# Patient Record
Sex: FEMALE | Race: ASIAN | NOT HISPANIC OR LATINO | Employment: OTHER | URBAN - METROPOLITAN AREA
[De-identification: names, ages, dates, MRNs, and addresses within clinical notes are randomized per-mention and may not be internally consistent; named-entity substitution may affect disease eponyms.]

---

## 2020-06-12 ENCOUNTER — AMBULATORY - HEALTHEAST (OUTPATIENT)
Dept: FAMILY MEDICINE | Facility: CLINIC | Age: 65
End: 2020-06-12

## 2020-06-12 ENCOUNTER — VIRTUAL VISIT (OUTPATIENT)
Dept: FAMILY MEDICINE | Facility: OTHER | Age: 65
End: 2020-06-12

## 2020-06-12 DIAGNOSIS — Z20.822 SUSPECTED COVID-19 VIRUS INFECTION: ICD-10-CM

## 2020-06-12 NOTE — PROGRESS NOTES
"Date: 2020 12:58:49  Clinician: Naveen Ferrara  Clinician NPI: [id number]  Patient: Yani Mitchell  Patient : 1955  Patient Address: [address] [place] [address]  Patient Phone: [phone]  Visit Protocol: URI  Patient Summary:  Yani is a 64 year old ( : 1955 ) female who initiated a Visit for COVID-19 (Coronavirus) evaluation and screening. When asked the question \"Please sign me up to receive news, health information and promotions from [employer].\", Yani responded \"No\".    Yani states her symptoms started suddenly 5-6 days ago.   Her symptoms consist of a sore throat, malaise, and myalgia.   Symptom details   Sore throat: Yani reports having mild throat pain (1-3 on a 10 point pain scale), does not have exudate on her tonsils, and can swallow liquids. The lymph nodes in her neck are not enlarged. A rash has not appeared on the skin since the sore throat started.    Yani denies having wheezing, nausea, teeth pain, ageusia, diarrhea, enlarged lymph nodes, anosmia, facial pain or pressure, fever, cough, nasal congestion, vomiting, rhinitis, ear pain, headache, and chills. She also denies having recent facial or sinus surgery in the past 60 days, double sickening (worsening symptoms after initial improvement), and taking antibiotic medication for the symptoms. She is not experiencing dyspnea.   Precipitating events  Within the past week, Yani has been exposed to someone with strep throat. She has not recently been exposed to someone with influenza. Yani has been in close contact with the following high risk individuals: adults 65 or older.   Pertinent COVID-19 (Coronavirus) information  In the past 14 days, Yani has not worked in a congregate living setting.   She does not work or volunteer as healthcare worker or a  and does not work or volunteer in a healthcare facility.   Yani also has not lived in a congregate living setting in the past 14 days. She lives with a healthcare worker.   Yani " has not had a close contact with a laboratory-confirmed COVID-19 patient within 14 days of symptom onset.   Pertinent medical history  Yani does not get yeast infections when she takes antibiotics.   Yani does not need a return to work/school note.   Weight: 140 lbs   Yani does not smoke or use smokeless tobacco.   Weight: 140 lbs    MEDICATIONS: No current medications, ALLERGIES: Penicillins, Vicks NyQuil Cold/Flu Liquicap  Clinician Response:  Dear Yani,      Your symptoms show that you may have coronavirus (COVID-19). This illness can cause fever, cough and trouble breathing. Many people get a mild case and get better on their own. Some people can get very sick.  What should I do?  We would like to test you for this virus. This will be a curbside test done outside the clinic.   1. Please call [phone] to schedule your visit. Explain that you were referred by [facility] to have a COVID-19 test. Be ready to share your [facility] visit ID number.  The following will serve as your written order for this COVID Test, ordered by me, for the indication of suspected COVID [Z20.828]: The test will be ordered in [employer], our electronic health record, after you are scheduled. It will show as ordered and authorized by Judah Moulton MD.  Order: COVID-19 (Coronavirus) PCR for SYMPTOMATIC testing from [facility].      2. When it's time for your COVID test:  Stay at least 6 feet away from others. (If someone will drive you to your test, stay in the backseat, as far away from the  as you can.)   Cover your mouth and nose with a mask, tissue or washcloth.  Go straight to the testing site. Don't make any stops on the way there or back.      3.Starting now: Stay home and away from others (self-isolate) until:   You've had no fever---and no medicine that reduces fever---for 3 full days (72 hours). And...   Your other symptoms have gotten better. For example, your cough or breathing has improved. And...   At least 10 days have passed since your  "symptoms started.       During this time, don't leave the house except for testing or medical care.   Stay in your own room, even for meals. Use your own bathroom if you can.   Stay away from others in your home. No hugging, kissing or shaking hands. No visitors.  Don't go to work, school or anywhere else.    Clean \"high touch\" surfaces often (doorknobs, counters, handles, etc.). Use a household cleaning spray or wipes. You'll find a full list of  on the EPA website: www.epa.gov/pesticide-registration/list-n-disinfectants-use-against-sars-cov-2.   Cover your mouth and nose with a mask, tissue or washcloth to avoid spreading germs.  Wash your hands and face often. Use soap and water.  Caregivers in these groups are at risk for severe illness due to COVID-19:  o People 65 years and older  o People who live in a nursing home or long-term care facility  o People with chronic disease (lung, heart, cancer, diabetes, kidney, liver, immunologic)  o People who have a weakened immune system, including those who:   Are in cancer treatment  Take medicine that weakens the immune system, such as corticosteroids  Had a bone marrow or organ transplant  Have an immune deficiency  Have poorly controlled HIV or AIDS  Are obese (body mass index of 40 or higher)  Smoke regularly   o Caregivers should wear gloves while washing dishes, handling laundry and cleaning bedrooms and bathrooms.  o Use caution when washing and drying laundry: Don't shake dirty laundry, and use the warmest water setting that you can.  o For more tips, go to www.cdc.gov/coronavirus/2019-ncov/downloads/10Things.pdf.      How can I take care of myself?   Get lots of rest. Drink extra fluids (unless a doctor has told you not to).   Take Tylenol (acetaminophen) for fever or pain. If you have liver or kidney problems, ask your family doctor if it's okay to take Tylenol.   Adults can take either:    650 mg (two 325 mg pills) every 4 to 6 hours, or...   1,000 mg " (two 500 mg pills) every 8 hours as needed.    Note: Don't take more than 3,000 mg in one day. Acetaminophen is found in many medicines (both prescribed and over-the-counter medicines). Read all labels to be sure you don't take too much.   For children, check the Tylenol bottle for the right dose. The dose is based on the child's age or weight.    If you have other health problems (like cancer, heart failure, an organ transplant or severe kidney disease): Call your specialty clinic if you don't feel better in the next 2 days.       Know when to call 911. Emergency warning signs include:    Trouble breathing or shortness of breath Pain or pressure in the chest that doesn't go away Feeling confused like you haven't felt before, or not being able to wake up Bluish-colored lips or face.  Where can I get more information?   [facility] -- About COVID-19: [email or web address]   CDC -- What to Do If You're Sick: www.cdc.gov/coronavirus/2019-ncov/about/steps-when-sick.html   CDC -- Ending Home Isolation: www.cdc.gov/coronavirus/2019-ncov/hcp/disposition-in-home-patients.html   CDC -- Caring for Someone: www.cdc.gov/coronavirus/2019-ncov/if-you-are-sick/care-for-someone.html   [facility] -- Interim Guidance for Hospital Discharge to Home: www.health.[facility].[place].us/diseases/coronavirus/hcp/hospdischarge.pdf   [facility] clinical trials (COVID-19 research studies): clinicalaffairs.[facility]-clinical-trials    Below are the COVID-19 hotlines at the [place] Department of Health ([facility]). Interpreters are available.    For health questions: Call 611-708-7139 or 1-660.881.9045 (7 a.m. to 7 p.m.) For questions about schools and childcare: Call [phone] or 1-989.623.1805 (7 a.m. to 7 p.m.)    Diagnosis: Other malaise  Diagnosis ICD: R53.81

## 2020-06-13 ENCOUNTER — COMMUNICATION - HEALTHEAST (OUTPATIENT)
Dept: FAMILY MEDICINE | Facility: CLINIC | Age: 65
End: 2020-06-13

## 2020-06-13 ENCOUNTER — COMMUNICATION - HEALTHEAST (OUTPATIENT)
Dept: EMERGENCY MEDICINE | Facility: CLINIC | Age: 65
End: 2020-06-13

## 2021-06-08 NOTE — TELEPHONE ENCOUNTER
Coronavirus (COVID-19) Notification    Patient  Meggan Mantilla    Reason for call  Notify of Positive Coronavirus (COVID-19) lab results, assess symptoms,  review [facility] recommendations    Lab Result    Lab test:  2019-nCoV rRt-PCR or SARS-CoV-2 PCR    Oropharyngeal AND/OR nasopharyngeal swabs is POSITIVE for 2019-nCoV RNA/SARS-COV-2 PCR (COVID-19 virus)    RN Recommendations/Instructions per [facility] Coronavirus COVID-19 recommendations    Brief introduction script  Hi, My name is Kay and I am calling on behalf of [employer].  We were notified that your Coronavirus test (COVID-19) for was POSITIVE for the virus.  I have some information to relay to you but first I wanted to mention that the [place] Dept of Health will be contacting you shortly [it's possible MD already called Patient] to talk to you more about how you are feeling and other people you have had contact with who might now also have the virus.  Also, [facility] is Partnering with the [facility] for Covid-19 research, you may be contacted directly by research staff.    Assessment (Inquire about Patient's current symptoms)  Doing well.  Symptoms started     If at time of call, Patients symptoms hare worsened, the Patient should contact 911 or have someone drive them to Emergency Dept promptly:      If Patient calling 911, inform 911 personal that you have tested positive for the Coronavirus (COVID-19).  Place mask on and await 911 to arrive.    If Emergency Dept, If possible, please have another adult drive you to the Emergency Dept but you need to wear mask when in contact with other people.      Review information with Patient    Your result was positive. This means you have COVID-19 (coronavirus).  We have sent you a letter that reviews the information that I'll be reviewing with you now.    How can I protect others?    If you have symptoms: stay home and away from others (self-isolate) until:    You've had no  fever--and no medicine that reduces fever--for 3 full days (72 hours). And      Your other symptoms have gotten better. For example, your cough or breathing has improved. And     At least 10 days have passed since your symptoms started.    If you don't have symptoms: Stay home and away from others (self-isolate) until at least 10 days have passed since your first positive COVID-19 test. (Date test collected).    During this time:    Stay in your own room, including for meals. Use your own bathroom if you can.    Stay away from others in your home. No hugging, kissing or shaking hands. No visitors.     Don't go to work, school or anywhere else.     Clean  high touch  surfaces often (doorknobs, counters, handles, etc.). Use a household cleaning spray or wipes. You'll find a full list on the EPA website at www.epa.gov/pesticide-registration/list-n-disinfectants-use-against-sars-cov-2.     Cover your mouth and nose with a mask, tissue or washcloth to avoid spreading germs.    Wash your hands and face often with soap and water.    Caregivers in these groups are at risk for severe illness due to COVID-19:  o People 65 years and older  o People who live in a nursing home or long-term care facility  o People with chronic disease (lung, heart, cancer, diabetes, kidney, liver, immunologic)  o People who have a weakened immune system, including those who:  - Are in cancer treatment  - Take medicine that weakens the immune system, such as corticosteroids  - Had a bone marrow or organ transplant  - Have an immune deficiency  - Have poorly controlled HIV or AIDS  - Are obese (body mass index of 40 or higher)  - Smoke regularly    Caregivers should wear gloves while washing dishes, handling laundry and cleaning bedrooms and bathrooms.    Wash and dry laundry with special caution. Don't shake dirty laundry, and use the warmest water setting you can.    If you have a weakened immune system, ask your doctor about other actions you  should take.    For more tips, go to www.cdc.gov/coronavirus/2019-ncov/downloads/10Things.pdf.    You should not go back to work until you meet the guidelines above for ending your home isolation. You should meet these along with any other guidelines that your employer has.    Employers: This document serves as formal notice of your employee's medical guidelines for going back to work. They must meet the above guidelines before going back to work in person.    How can I take care of myself?    1. Get lots of rest. Drink extra fluids (unless a doctor has told you not to).    2. Take Tylenol (acetaminophen) for fever or pain. If you have liver or kidney problems, ask your family doctor if it's okay to take Tylenol.     Take either:     650 mg (two 325 mg pills) every 4 to 6 hours, or     1,000 mg (two 500 mg pills) every 8 hours as needed.     Note: Don't take more than 3,000 mg in one day. Acetaminophen is found in many medicines (both prescribed and over-the-counter medicines). Read all labels to be sure you don't take too much.    For children, check the Tylenol bottle for the right dose (based on their age or weight).    3. If you have other health problems (like cancer, heart failure, an organ transplant or severe kidney disease): Call your specialty clinic if you don't feel better in the next 2 days.    4. Know when to call 911: Emergency warning signs include:    Trouble breathing or shortness of breath    Pain or pressure in the chest that doesn't go away    Feeling confused like you haven't felt before, or not being able to wake up    Bluish-colored lips or face    5. Sign up for [employer]. We know it's scary to hear that you have COVID-19. We want to track your symptoms to make sure you're okay over the next 2 weeks. Please look for an email from [employer]--this is a free, online program that we'll use to keep in touch. To sign up, follow the link in the email. Learn more at  [email or web address].    Where can I get more information?    [facility]: [email or web address]    Coronavirus Basics: www.health.[facility].mn.us/diseases/coronavirus/basics.html    What to Do If You're Sick: www.cdc.gov/coronavirus/2019-ncov/about/steps-when-sick.html    Ending Home Isolation: www.cdc.gov/coronavirus/2019-ncov/hcp/disposition-in-home-patients.html     Caring for Someone with COVID-19: www.cdc.gov/coronavirus/2019-ncov/if-you-are-sick/care-for-someone.html     [facility] clinical trials (COVID-19 research studies): clinicalaffairs.[facility]-clinical-trials     Positive COVID-19 letter sent (Yes/No):  Yes  Kay Li, MSN, RN

## 2021-06-20 NOTE — LETTER
Letter by Kay Li RN at      Author: Kay Li RN Service: -- Author Type: --    Filed:  Encounter Date: 6/13/2020 Status: (Other)       6/13/2020        Meggan Mantilla  [address] [place] [address]    This letter provides a written record that you were tested for COVID-19 on 6/12/20     Your result was positive.     This means that we found the virus that causes COVID-19 in your sample.    How can I protect others?    For safety, its very important to follow these rules.    First, stay home and away from others (self-isolate) until:      Youve had no fever--and no medicine that reduces fever--for 3 full days (72 hours). And?     Your other symptoms have gotten better. For example, your cough or breathing has improved. And?    At least 10 days have passed since your symptoms started.    During this time:      Stay in your own room (and use your own bathroom), if you can.    Stay away from others in your home. No hugging, kissing or shaking hands.    Dont let anyone visit.    Dont go to work, school or anywhere else.     Clean high touch surfaces often (doorknobs, counters, handles, etc.). Use a household cleaning spray or wipes.    Cover your mouth and nose with a mask, tissue or washcloth to avoid spreading germs.    Wash your hands and face often with soap and water.    You should not go back to work until you meet the guidelines above for ending your home isolation. You should meet these along with any other guidelines that your employer has.    Employers: This document serves as formal notice of your employees medical guidelines for going back to work. They must meet the above guidelines before going back to work in person.    How can I take care of myself?    1. Get lots of rest. Drink extra fluids (unless a doctor has told you not to).    2. Take Tylenol (acetaminophen) for fever or pain. If you have liver or kidney problems, ask your family doctor if its okay to take Tylenol.      Take either:     650 mg (two 325 mg pills) every 4 to 6 hours, or?    1,000 mg (two 500 mg pills) every 8 hours as needed.     Note: Dont take more than 3,000 mg in one day. Acetaminophen is found in many medicines (both prescribed and over-the-counter medicines). Read all labels to be sure you dont take too much.  For children, check the Tylenol bottle for the right dose. The dose is based on the lucero age or weight.    1. If you have other health problems (like cancer, heart failure, an organ transplant or severe kidney disease): Call your specialty clinic if you dont feel better in the next 2 days.    2. Know when to call 911: If your breathing is so bad that it keeps you from doing normal activities, call 911 or go to the emergency room. Tell them that youve been staying home and may have COVID-19.    3. Sign up for [employer]. We know its scary to hear that you have COVID-19. We want to track your symptoms to make sure youre okay over the next 2 weeks. Please look for an email from [employer]--this is a free, online program that well use to keep in touch. To sign up, follow the link in the email. Learn more at [email or web address].    4. Interested is participating in research? Visit the link below to view current clinical trials that apply to your situation:  https://clinicalaffairs.[facility].edu/[facility]-clinical-trials    Where can I get more information?    To learn the [facility] guidelines for staying home, please visit the [place] Department of Health website at https://www.health.state.[place].us/diseases/coronavirus/basics.html.    To learn more about COVID-19 and how to care for yourself at home, please visit the CDC website at https://www.cdc.gov/coronavirus/2019-ncov/about/steps-when-sick.html.    For more options for care at [facility], please visit our website at [email or web address]/.

## 2021-06-20 NOTE — LETTER
Letter by Kay Li RN at      Author: Kay Li RN Service: -- Author Type: --    Filed:  Encounter Date: 6/13/2020 Status: (Other)       6/13/2020        Meggan Mantilla  [address] [place] [address]    This letter provides a written record that you were tested for COVID-19 on 6/12/20.     Your result was negative.    This means that we didnt find the virus that causes COVID-19 in your sample. A test may show negative when you do actually have the virus. This can happen when the virus is in the early stages of infection, before you feel illness symptoms.    Even if you dont have symptoms, they may still appear. For safety, its very important to follow these rules.    Keep yourself away from others (self-isolation):      Stay home. Dont go to work, school or anywhere else.     Stay in your own room (and use your own bathroom), if you can.    Stay away from others in your home. No hugging, kissing or shaking hands. No visitors.    Clean high touch surfaces often (doorknobs, counters, handles, etc.). Use a household cleaning spray or wipes.    Cover your mouth and nose with a mask, tissue or washcloth to avoid spreading germs.    Wash your hands and face often with soap and water.    Stay in self-isolation until you meet ALL of the guidelines below:    1. You have had no fever for at least 72 hours (that is 3 full days of no fever without the use of medicine that reduces fevers), AND  2. other symptoms (such as cough, shortness of breath) have gotten better, AND  3. at least 10 days have passed since your symptoms first appeared.    Going back to work  Check with your employer for any guidelines to follow for going back to work.    Employers: This document serves as formal notice that your employee tested negative for COVID-19, as of the testing date shown above.    For questions regarding this letter or your Negative COVID-19 result, call [phone] between 8A to 6:30P (M-F) and 10A to  6:30P (weekends).

## 2021-06-27 ENCOUNTER — HEALTH MAINTENANCE LETTER (OUTPATIENT)
Age: 66
End: 2021-06-27

## 2021-10-17 ENCOUNTER — HEALTH MAINTENANCE LETTER (OUTPATIENT)
Age: 66
End: 2021-10-17

## 2022-07-11 ENCOUNTER — HOSPITAL ENCOUNTER (EMERGENCY)
Facility: CLINIC | Age: 67
Discharge: HOME | End: 2022-07-11
Attending: EMERGENCY MEDICINE | Admitting: EMERGENCY MEDICINE
Payer: COMMERCIAL

## 2022-07-11 ENCOUNTER — APPOINTMENT (OUTPATIENT)
Dept: CT IMAGING | Facility: CLINIC | Age: 67
End: 2022-07-11
Attending: EMERGENCY MEDICINE
Payer: COMMERCIAL

## 2022-07-11 VITALS
DIASTOLIC BLOOD PRESSURE: 79 MMHG | RESPIRATION RATE: 16 BRPM | WEIGHT: 137 LBS | HEIGHT: 60 IN | HEART RATE: 58 BPM | OXYGEN SATURATION: 100 % | BODY MASS INDEX: 26.9 KG/M2 | SYSTOLIC BLOOD PRESSURE: 151 MMHG | TEMPERATURE: 97.7 F

## 2022-07-11 DIAGNOSIS — K57.30 DIVERTICULOSIS OF LARGE INTESTINE WITHOUT HEMORRHAGE: ICD-10-CM

## 2022-07-11 DIAGNOSIS — K62.5 RECTAL BLEEDING: ICD-10-CM

## 2022-07-11 LAB
ABO/RH(D): NORMAL
ALBUMIN SERPL-MCNC: 3.9 G/DL (ref 3.5–5)
ALBUMIN UR-MCNC: NEGATIVE MG/DL
ALP SERPL-CCNC: 65 U/L (ref 45–120)
ALT SERPL W P-5'-P-CCNC: 14 U/L (ref 0–45)
ANION GAP SERPL CALCULATED.3IONS-SCNC: 8 MMOL/L (ref 5–18)
ANTIBODY SCREEN: NEGATIVE
APPEARANCE UR: CLEAR
APTT PPP: 31 SECONDS (ref 22–38)
AST SERPL W P-5'-P-CCNC: 20 U/L (ref 0–40)
ATRIAL RATE - MUSE: 58 BPM
BASOPHILS # BLD AUTO: 0 10E3/UL (ref 0–0.2)
BASOPHILS NFR BLD AUTO: 1 %
BILIRUB SERPL-MCNC: 0.4 MG/DL (ref 0–1)
BILIRUB UR QL STRIP: NEGATIVE
BUN SERPL-MCNC: 24 MG/DL (ref 8–22)
CALCIUM SERPL-MCNC: 9.4 MG/DL (ref 8.5–10.5)
CHLORIDE BLD-SCNC: 109 MMOL/L (ref 98–107)
CO2 SERPL-SCNC: 23 MMOL/L (ref 22–31)
COLOR UR AUTO: COLORLESS
CREAT SERPL-MCNC: 0.72 MG/DL (ref 0.6–1.1)
DIASTOLIC BLOOD PRESSURE - MUSE: NORMAL MMHG
EOSINOPHIL # BLD AUTO: 0.1 10E3/UL (ref 0–0.7)
EOSINOPHIL NFR BLD AUTO: 1 %
ERYTHROCYTE [DISTWIDTH] IN BLOOD BY AUTOMATED COUNT: 12.9 % (ref 10–15)
GFR SERPL CREATININE-BSD FRML MDRD: >90 ML/MIN/1.73M2
GLUCOSE BLD-MCNC: 97 MG/DL (ref 70–125)
GLUCOSE UR STRIP-MCNC: NEGATIVE MG/DL
HCT VFR BLD AUTO: 38.4 % (ref 35–47)
HEMOCCULT STL QL: POSITIVE
HGB BLD-MCNC: 12.4 G/DL (ref 11.7–15.7)
HGB UR QL STRIP: NEGATIVE
IMM GRANULOCYTES # BLD: 0 10E3/UL
IMM GRANULOCYTES NFR BLD: 0 %
INR PPP: 0.96 (ref 0.85–1.15)
INTERPRETATION ECG - MUSE: NORMAL
KETONES UR STRIP-MCNC: NEGATIVE MG/DL
LEUKOCYTE ESTERASE UR QL STRIP: NEGATIVE
LIPASE SERPL-CCNC: 28 U/L (ref 0–52)
LYMPHOCYTES # BLD AUTO: 1.5 10E3/UL (ref 0.8–5.3)
LYMPHOCYTES NFR BLD AUTO: 24 %
MCH RBC QN AUTO: 29.1 PG (ref 26.5–33)
MCHC RBC AUTO-ENTMCNC: 32.3 G/DL (ref 31.5–36.5)
MCV RBC AUTO: 90 FL (ref 78–100)
MONOCYTES # BLD AUTO: 0.4 10E3/UL (ref 0–1.3)
MONOCYTES NFR BLD AUTO: 7 %
NEUTROPHILS # BLD AUTO: 4.2 10E3/UL (ref 1.6–8.3)
NEUTROPHILS NFR BLD AUTO: 67 %
NITRATE UR QL: NEGATIVE
NRBC # BLD AUTO: 0 10E3/UL
NRBC BLD AUTO-RTO: 0 /100
P AXIS - MUSE: 25 DEGREES
PH UR STRIP: 6.5 [PH] (ref 5–7)
PLATELET # BLD AUTO: 192 10E3/UL (ref 150–450)
POTASSIUM BLD-SCNC: 4.3 MMOL/L (ref 3.5–5)
PR INTERVAL - MUSE: 172 MS
PROT SERPL-MCNC: 7.6 G/DL (ref 6–8)
QRS DURATION - MUSE: 80 MS
QT - MUSE: 434 MS
QTC - MUSE: 426 MS
R AXIS - MUSE: 30 DEGREES
RBC # BLD AUTO: 4.26 10E6/UL (ref 3.8–5.2)
RBC URINE: <1 /HPF
SODIUM SERPL-SCNC: 140 MMOL/L (ref 136–145)
SP GR UR STRIP: 1.05 (ref 1–1.03)
SPECIMEN EXPIRATION DATE: NORMAL
SYSTOLIC BLOOD PRESSURE - MUSE: NORMAL MMHG
T AXIS - MUSE: 30 DEGREES
TROPONIN I SERPL-MCNC: <0.01 NG/ML (ref 0–0.29)
UROBILINOGEN UR STRIP-MCNC: <2 MG/DL
VENTRICULAR RATE- MUSE: 58 BPM
WBC # BLD AUTO: 6.2 10E3/UL (ref 4–11)
WBC URINE: <1 /HPF

## 2022-07-11 PROCEDURE — 84484 ASSAY OF TROPONIN QUANT: CPT | Performed by: EMERGENCY MEDICINE

## 2022-07-11 PROCEDURE — 86850 RBC ANTIBODY SCREEN: CPT | Performed by: EMERGENCY MEDICINE

## 2022-07-11 PROCEDURE — 85730 THROMBOPLASTIN TIME PARTIAL: CPT | Performed by: EMERGENCY MEDICINE

## 2022-07-11 PROCEDURE — 93005 ELECTROCARDIOGRAM TRACING: CPT | Performed by: EMERGENCY MEDICINE

## 2022-07-11 PROCEDURE — 81001 URINALYSIS AUTO W/SCOPE: CPT | Performed by: EMERGENCY MEDICINE

## 2022-07-11 PROCEDURE — 74177 CT ABD & PELVIS W/CONTRAST: CPT

## 2022-07-11 PROCEDURE — 80053 COMPREHEN METABOLIC PANEL: CPT | Performed by: EMERGENCY MEDICINE

## 2022-07-11 PROCEDURE — 99285 EMERGENCY DEPT VISIT HI MDM: CPT | Mod: 25

## 2022-07-11 PROCEDURE — 85610 PROTHROMBIN TIME: CPT | Performed by: EMERGENCY MEDICINE

## 2022-07-11 PROCEDURE — 250N000011 HC RX IP 250 OP 636: Performed by: EMERGENCY MEDICINE

## 2022-07-11 PROCEDURE — 85025 COMPLETE CBC W/AUTO DIFF WBC: CPT | Performed by: EMERGENCY MEDICINE

## 2022-07-11 PROCEDURE — 36415 COLL VENOUS BLD VENIPUNCTURE: CPT | Performed by: EMERGENCY MEDICINE

## 2022-07-11 PROCEDURE — 83690 ASSAY OF LIPASE: CPT | Performed by: EMERGENCY MEDICINE

## 2022-07-11 PROCEDURE — 82272 OCCULT BLD FECES 1-3 TESTS: CPT | Performed by: EMERGENCY MEDICINE

## 2022-07-11 RX ORDER — IOPAMIDOL 755 MG/ML
100 INJECTION, SOLUTION INTRAVASCULAR ONCE
Status: COMPLETED | OUTPATIENT
Start: 2022-07-11 | End: 2022-07-11

## 2022-07-11 RX ADMIN — IOPAMIDOL 100 ML: 755 INJECTION, SOLUTION INTRAVENOUS at 16:14

## 2022-07-11 ASSESSMENT — ENCOUNTER SYMPTOMS
ABDOMINAL PAIN: 1
CHILLS: 0
FEVER: 0
BACK PAIN: 1
SHORTNESS OF BREATH: 0
HEMATURIA: 0
RECTAL PAIN: 0
HEADACHES: 0
EYE PAIN: 0
SORE THROAT: 0
DYSURIA: 0
BLOOD IN STOOL: 1
NUMBNESS: 1

## 2022-07-11 NOTE — ED PROVIDER NOTES
EMERGENCY DEPARTMENT ENCOUNTER      NAME: Meggan Mantilla  AGE: 66 year old female  YOB: 1955  MRN: [id number]  EVALUATION DATE & TIME: No admission date for patient encounter.    PCP: No primary care provider on file.    ED PROVIDER: Jovita Ferguson M.D.      CHIEF COMPLAINT     Chief Complaint   Patient presents with     Abdominal Pain     Rectal Bleeding         FINAL IMPRESSION:     1. Rectal bleeding    2. Diverticulosis of large intestine without hemorrhage          MEDICAL DECISION MAKING:       Pertinent Labs & Imaging studies reviewed. (See chart for details)    66 year old female presents to the Emergency Department for evaluation of left-sided abdominal pain and rectal bleeding.    ED Course as of 07/11/22 1809   Mon Jul 11, 2022   1711 Mrs. Mantilla is a 66-year-old female presents complaining of rectal bleeding and left-sided abdominal pain.  The bleeding has been going on since the beginning of July at around the ninth and the left lower abdominal pain has been intermittent in the longer than that.  She had a colonoscopy a few months ago was normal.  No urinary symptoms.  No chest pain or shortness of breath   1712 She is well-appearing on examination nontoxic.  Benign abdominal exam no guarding or rebound.   1712 Differential diagnosis for rectal bleeding and left lower quadrant abdominal pain include bleeding diverticulitis diverticulitis perforation malignancy AVM dissection aneurysm renal colic is mesenteric ischemia among others.   1712 IV was established.   1713 Normal white blood cell count hemoglobin normal renal function.  Normal liver function test normal coagulation normal urine.   1713 Patient according to the son had a reassuring colonoscopy.  No evidence of active hemorrhage right now she is hemodynamically stable with a normal hemoglobin.  We will get her to follow-up with primary care doctor and return for any concerns.   1727 With Dr. Yang gastroenterologist who was able to review  patient's records.  Her colonoscopy was in February.  She diverticulosis but no polyps.  She did have parasites and was given omendazole so to treat them.  .  Celia states is possible that the bleeding is from internal hemorrhoids or diverticular bleeding.  Given that she is stable she can be discharged follow-up with her primary care doctor and then for possible anoscope.   1804 Discussed this with the son.  He feels comfortable with this plan patient discharged ambulatory in stable condition.   1804 Clinical impression and decision making  66-year-old female presents here with left lower quadrant abdominal pain and rectal bleeding is intermittent abdominal pain has been going on for a few months the rectal bleeding has been persistent 9.  There is no pattern to it not associated with chest pain shortness of breath lightheadedness or dizziness.   1805 On examination she is well-appearing in no distress benign abdominal exam digital rectal examination reveals no external hemorrhoids no fissures bright red blood no oozing.   1805 Her hemoglobin is 12.4 normal platelets.  The close has hemoglobin that I have on [facility] is 2017 and it was 12.6.   1806 He had a colonoscopy in February that revealed diverticulosis and parasites but no other abnormalities.   1807 CT shows diverticulosis no masses no active source of bleeding.  Discussed with gastroenterology possible that this is internal hemorrhoids versus diverticular bleed she is otherwise stable very reliable son is very caring will discharge with follow-up primary care doctor this week for repeat hemoglobin and referral back to GI.  She was given strict discharge instructions that if the bleeding is more frequent she feels lightheaded abdominal pain fever to return.  Patient discharged ambulatory in stable condition.         Differential Diagnosis (include but not limited to)        Vital Signs: Hypertension  EKG: Sinus bradycardia  Imaging: CT abdomen  pelvis diverticulosis no acute  Home Meds: Reviewed  ED meds/abx:  Fluids: None none    Labs  K 4.3  Cr 0.72  Wbc 6.2  Hgb 12.4  Platelets 192  Troponin negative  Lipase 28  UA negative    Review of Previous Records  Per chart review, patient presented to [facility] ED on 1/18/2020 for a cough. 64 y.o. female presents to the Emergency Department for evaluation of progressive cough and worsening shortness of breath x2 weeks. After obtaining history of present illness, performing physical exam, and reviewing past medical records we decided to manage with a neb treatment here as well as screen with a chest x-ray.The chest x-ray returned unremarkable for acute infiltrate but did describe mild interstitial edema.  This is a nonspecific finding.  From a clinical perspective I do believe that the patient is suffering from pneumonia and could benefit from a course of antibiotics as well as an increase in steroids.  She was seen in urgent care and put on 10 mg of prednisone once daily, I feel that this dose is quite low for her acute illness.  She is not requiring any type of supportive cares with her oxygen supplementation.  She does not appear toxic therefore did not feel that she needed admission.When I looked further in the patient's records she has been dealing with cough/pneumonia diagnosis since October.  It does seem that symptoms have resolved and she is gotten back to her baseline but because of this remote history I did discuss further work-up in the form of full sets of labs, namely to look at cardiac possibilities, as well as a CT scan of the chest.  At this point time after hearing these options patient as well as family are comfortable with trialing a course of antibiotics and prednisone at home and will follow-up with her primary care or return to the ER if there is worsening symptoms.    Consults  Dr. Yang, GI    ED COURSE   4:44 PM I met with the patient in triage to gather history and to perform my initial  exam. We discussed plans for the ED course, including diagnostic testing and treatment. PPE: N95 mask, gloves, eye protection, scrub cap  5:22 PM I spoke to KWAKU Segal.   5:30 PM I rechecked and updated the patient who is now in room 7.   5:39 PM I paged Dr. Yang again.   5:54 PM I spoke to Dr. Yang again.   5:56 PM We discussed the plan for discharge and the patient is agreeable. Reviewed supportive cares, symptomatic treatment, outpatient follow up, and reasons to return to the Emergency Department. Patient to be discharged by ED RN.   At the conclusion of the encounter I discussed the results of all of the tests and the disposition. The questions were answered. The patient and son acknowledged understanding and was agreeable with the care plan.         MEDICATIONS GIVEN IN THE EMERGENCY:     Medications   iopamidol (ISOVUE-370) solution 100 mL (100 mLs Intravenous Given 7/11/22 1614)       NEW PRESCRIPTIONS STARTED AT TODAY'S ER VISIT     Discharge Medication List as of 7/11/2022  6:02 PM             =================================================================    HPI     Patient information was obtained from: patient     Use of :  Yes ( Phone) - Language Nathalie Mantilla is a 66 year old female who presents by walk in accompanied by her son for evaluation of left sided abdominal pain and bloody stools.     Patient reports intermittent bloody stools that began on July 9th. She denies any associated rectal pain. She also has left sided abdominal pain that has been intermittent for the past 10 months that sometimes radiates to her left back. She called the clinic today and was told to come in to the ED to be evaluated.     She notes occasional bilateral thigh numbness that has been present for more than 6 months but states she is otherwise healthy.     Denies any urinary symptoms, fevers, chills, headache, problems with her eyes, nose, or throat, chest pain, shortness of breath, syncope, or leg  swelling.     Patient is vaccinated against COVID-19. Denies smoking or drinking.     REVIEW OF SYSTEMS   Review of Systems   Constitutional: Negative for chills and fever.   HENT: Negative for ear pain and sore throat.    Eyes: Negative for pain and visual disturbance.   Respiratory: Negative for shortness of breath.    Cardiovascular: Negative for chest pain and leg swelling.   Gastrointestinal: Positive for abdominal pain (left sided and intermittent) and blood in stool (intermittent). Negative for rectal pain.   Genitourinary: Negative for dysuria and hematuria.   Musculoskeletal: Positive for back pain (left, intermittent).   Neurological: Positive for numbness (intermittent bilateral thigh). Negative for syncope and headaches.   All other systems reviewed and are negative.       PAST MEDICAL HISTORY:   History reviewed. No pertinent past medical history.    PAST SURGICAL HISTORY:   History reviewed. No pertinent surgical history.      CURRENT MEDICATIONS:   dicyclomine (BENTYL) 10 MG capsule         ALLERGIES:     Allergies   Allergen Reactions     Cat Dander [Animal Dander] Unknown     Nose pain     Dog Dander [Dog Epithelium] Unknown     Nose pain     Nyquil [External Allergen Needs Reconciliation - See Comment] Unknown     Sores in throat and mouth       FAMILY HISTORY:   History reviewed. No pertinent family history.    SOCIAL HISTORY:     Social History     Socioeconomic History     Marital status: Single   Tobacco Use     Smoking status: Never Smoker       VITALS:   BP (!) 151/79   Pulse 58   Temp 97.7  F (36.5  C) (Oral)   Resp 16   Ht 1.524 m (5')   Wt 62.1 kg (137 lb)   SpO2 100%   BMI 26.76 kg/m      PHYSICAL EXAM     Physical Exam  Vitals and nursing note reviewed. Exam conducted with a chaperone present.   Constitutional:       Appearance: She is well-developed.   Abdominal:      Comments: Soft no reproducible tenderness no guarding no rebound.  2+ femoral pulses.   Genitourinary:      Comments: With chaperone external anal area no fissures no external hemorrhoids digital rectal examination reveals no masses there is blood no hemorrhaging no bruising.  Neurological:      Mental Status: She is alert.         Physical Exam   Constitutional: Well appearing, cooperative, pleasant    Head: Atraumatic.     Nose: Nose normal.     Mouth/Throat: Oropharynx is clear and moist.     Eyes: EOM are normal. Pupils are equal, round, and reactive to light.     Ears: Bilateral pearly white TM.    Neck: Normal range of motion. Neck supple.     Cardiovascular: Normal rate, regular rhythm and normal heart sounds.      Pulmonary/Chest: Normal effort  and breath sounds normal.     Abdominal: Soft.    Musculoskeletal: Normal range of motion.     Neurological: No deficits.    Lymphatics: No edema    : LUBA bright blood note was seen no hemorrhaging.    Skin: Skin is warm and dry.     Psychiatric: Normal mood and affect. Behavior is normal.       LAB:     All pertinent labs reviewed and interpreted.  Labs Ordered and Resulted from Time of ED Arrival to Time of ED Departure   COMPREHENSIVE METABOLIC PANEL - Abnormal       Result Value    Sodium 140      Potassium 4.3      Chloride 109 (*)     Carbon Dioxide (CO2) 23      Anion Gap 8      Urea Nitrogen 24 (*)     Creatinine 0.72      Calcium 9.4      Glucose 97      Alkaline Phosphatase 65      AST 20      ALT 14      Protein Total 7.6      Albumin 3.9      Bilirubin Total 0.4      GFR Estimate >90     ROUTINE UA WITH MICROSCOPIC REFLEX TO CULTURE - Abnormal    Color Urine Colorless      Appearance Urine Clear      Glucose Urine Negative      Bilirubin Urine Negative      Ketones Urine Negative      Specific Gravity Urine 1.049 (*)     Blood Urine Negative      pH Urine 6.5      Protein Albumin Urine Negative      Urobilinogen Urine <2.0      Nitrite Urine Negative      Leukocyte Esterase Urine Negative      RBC Urine <1      WBC Urine <1     INR - Normal    INR 0.96      PARTIAL THROMBOPLASTIN TIME - Normal    aPTT 31     LIPASE - Normal    Lipase 28     TROPONIN I - Normal    Troponin I <0.01     CBC WITH PLATELETS AND DIFFERENTIAL    WBC Count 6.2      RBC Count 4.26      Hemoglobin 12.4      Hematocrit 38.4      MCV 90      MCH 29.1      MCHC 32.3      RDW 12.9      Platelet Count 192      % Neutrophils 67      % Lymphocytes 24      % Monocytes 7      % Eosinophils 1      % Basophils 1      % Immature Granulocytes 0      NRBCs per 100 WBC 0      Absolute Neutrophils 4.2      Absolute Lymphocytes 1.5      Absolute Monocytes 0.4      Absolute Eosinophils 0.1      Absolute Basophils 0.0      Absolute Immature Granulocytes 0.0      Absolute NRBCs 0.0     TYPE AND SCREEN, ADULT    ABO/RH(D) B POS      Antibody Screen Negative      SPECIMEN EXPIRATION DATE [id number]     ABO/RH TYPE AND SCREEN        RADIOLOGY:     Reviewed all pertinent imaging. Please see official radiology report.  CT Abdomen Pelvis w Contrast   Final Result   IMPRESSION:    1.  No abnormalities are seen to explain pain.   2.  There are a few sigmoid diverticuli but no evidence of diverticulitis.           EKG:     EKG #1  Sinus bradycardia normal anterior progression normal axis    Time:810257    Ventricular rate 58 bmp  Axis normal  VA interval 172 ms  QRS duration 80 ms  QT//426 ms    Compared to previous EKG on no previous available for comparison  I have independently reviewed and interpreted the EKG(s) documented above.      PROCEDURES:     Procedures      I, Rhea Franco, am serving as a scribe to document services personally performed by Dr. Ferguson based on my observation and the provider's statements to me. I, Jovita Ferguson MD attest that Rhea Franco is acting in a scribe capacity, has observed my performance of the services and has documented them in accordance with my direction.    Jovita Ferguson M.D.  Emergency Medicine  [facility]  EMERGENCY ROOM  [address] [place] [id number]  [phone]  Dept: [phone]     Jovita Ferguson MD  07/11/22 7030

## 2022-07-11 NOTE — ED TRIAGE NOTES
"Pt presents to the ED with c/o LLQ abdominal pain for \"a while\" and blood in stools since 7/9/22. Pt has seen MD for this and has had a colonoscopy this year. Denies fevers or emesis.        "

## 2022-07-11 NOTE — DISCHARGE INSTRUCTIONS
Read and follow the discharge instructions.    Your CT shows some diverticulosis but did not show any inflammation or infection.    It is Possible that your bleeding is coming from an internal hemorrhoid or from diverticuli.    Your primary care doctor tomorrow to make a follow-up appointment for reevaluation this week to have your blood checked.    Will benefit from seeing the gastroenterologist again to see where the source of the bleeding is coming from.    Return immediately if you having all bloody stools you feel fainting worsening pain fever or any other concerns

## 2022-07-24 ENCOUNTER — HEALTH MAINTENANCE LETTER (OUTPATIENT)
Age: 67
End: 2022-07-24

## 2022-10-02 ENCOUNTER — HEALTH MAINTENANCE LETTER (OUTPATIENT)
Age: 67
End: 2022-10-02

## 2023-05-11 ENCOUNTER — APPOINTMENT (OUTPATIENT)
Dept: CT IMAGING | Facility: HOSPITAL | Age: 68
End: 2023-05-11
Payer: COMMERCIAL

## 2023-05-11 ENCOUNTER — HOSPITAL ENCOUNTER (EMERGENCY)
Facility: HOSPITAL | Age: 68
Discharge: HOME | End: 2023-05-11
Admitting: PHYSICIAN ASSISTANT
Payer: COMMERCIAL

## 2023-05-11 VITALS
TEMPERATURE: 97.8 F | OXYGEN SATURATION: 99 % | BODY MASS INDEX: 27.48 KG/M2 | SYSTOLIC BLOOD PRESSURE: 178 MMHG | WEIGHT: 140 LBS | HEIGHT: 60 IN | HEART RATE: 58 BPM | RESPIRATION RATE: 17 BRPM | DIASTOLIC BLOOD PRESSURE: 99 MMHG

## 2023-05-11 DIAGNOSIS — R42 DIZZINESS: ICD-10-CM

## 2023-05-11 LAB
ALBUMIN SERPL BCG-MCNC: 3.9 G/DL (ref 3.5–5.2)
ALBUMIN UR-MCNC: NEGATIVE MG/DL
ALP SERPL-CCNC: 76 U/L (ref 35–104)
ALT SERPL W P-5'-P-CCNC: 19 U/L (ref 10–35)
ANION GAP SERPL CALCULATED.3IONS-SCNC: 10 MMOL/L (ref 7–15)
APPEARANCE UR: CLEAR
AST SERPL W P-5'-P-CCNC: 28 U/L (ref 10–35)
BILIRUB SERPL-MCNC: 0.3 MG/DL
BILIRUB UR QL STRIP: NEGATIVE
BUN SERPL-MCNC: 20.3 MG/DL (ref 8–23)
CALCIUM SERPL-MCNC: 9.1 MG/DL (ref 8.8–10.2)
CHLORIDE SERPL-SCNC: 107 MMOL/L (ref 98–107)
COLOR UR AUTO: COLORLESS
CREAT SERPL-MCNC: 0.95 MG/DL (ref 0.51–0.95)
DEPRECATED HCO3 PLAS-SCNC: 23 MMOL/L (ref 22–29)
ERYTHROCYTE [DISTWIDTH] IN BLOOD BY AUTOMATED COUNT: 13.2 % (ref 10–15)
GFR SERPL CREATININE-BSD FRML MDRD: 65 ML/MIN/1.73M2
GLUCOSE SERPL-MCNC: 98 MG/DL (ref 70–99)
GLUCOSE UR STRIP-MCNC: NEGATIVE MG/DL
HCT VFR BLD AUTO: 35.1 % (ref 35–47)
HGB BLD-MCNC: 11.5 G/DL (ref 11.7–15.7)
HGB UR QL STRIP: NEGATIVE
KETONES UR STRIP-MCNC: NEGATIVE MG/DL
LEUKOCYTE ESTERASE UR QL STRIP: ABNORMAL
MAGNESIUM SERPL-MCNC: 2.1 MG/DL (ref 1.7–2.3)
MCH RBC QN AUTO: 29.6 PG (ref 26.5–33)
MCHC RBC AUTO-ENTMCNC: 32.8 G/DL (ref 31.5–36.5)
MCV RBC AUTO: 90 FL (ref 78–100)
MUCOUS THREADS #/AREA URNS LPF: PRESENT /LPF
NITRATE UR QL: NEGATIVE
PH UR STRIP: 6 [PH] (ref 5–7)
PLATELET # BLD AUTO: 181 10E3/UL (ref 150–450)
POTASSIUM SERPL-SCNC: 3.7 MMOL/L (ref 3.4–5.3)
PROT SERPL-MCNC: 6.7 G/DL (ref 6.4–8.3)
RBC # BLD AUTO: 3.89 10E6/UL (ref 3.8–5.2)
RBC URINE: <1 /HPF
SODIUM SERPL-SCNC: 140 MMOL/L (ref 136–145)
SP GR UR STRIP: 1.01 (ref 1–1.03)
SQUAMOUS EPITHELIAL: <1 /HPF
UROBILINOGEN UR STRIP-MCNC: <2 MG/DL
WBC # BLD AUTO: 4.9 10E3/UL (ref 4–11)
WBC URINE: 1 /HPF

## 2023-05-11 PROCEDURE — 99285 EMERGENCY DEPT VISIT HI MDM: CPT | Mod: 25

## 2023-05-11 PROCEDURE — 70450 CT HEAD/BRAIN W/O DYE: CPT

## 2023-05-11 PROCEDURE — 80053 COMPREHEN METABOLIC PANEL: CPT | Performed by: PHYSICIAN ASSISTANT

## 2023-05-11 PROCEDURE — 96360 HYDRATION IV INFUSION INIT: CPT

## 2023-05-11 PROCEDURE — 250N000013 HC RX MED GY IP 250 OP 250 PS 637: Performed by: PHYSICIAN ASSISTANT

## 2023-05-11 PROCEDURE — 93005 ELECTROCARDIOGRAM TRACING: CPT | Performed by: PHYSICIAN ASSISTANT

## 2023-05-11 PROCEDURE — 82310 ASSAY OF CALCIUM: CPT | Performed by: PHYSICIAN ASSISTANT

## 2023-05-11 PROCEDURE — 85014 HEMATOCRIT: CPT | Performed by: PHYSICIAN ASSISTANT

## 2023-05-11 PROCEDURE — 87086 URINE CULTURE/COLONY COUNT: CPT | Performed by: PHYSICIAN ASSISTANT

## 2023-05-11 PROCEDURE — 258N000003 HC RX IP 258 OP 636: Performed by: PHYSICIAN ASSISTANT

## 2023-05-11 PROCEDURE — 83735 ASSAY OF MAGNESIUM: CPT | Performed by: PHYSICIAN ASSISTANT

## 2023-05-11 PROCEDURE — 36415 COLL VENOUS BLD VENIPUNCTURE: CPT | Performed by: PHYSICIAN ASSISTANT

## 2023-05-11 PROCEDURE — 96361 HYDRATE IV INFUSION ADD-ON: CPT

## 2023-05-11 PROCEDURE — 81003 URINALYSIS AUTO W/O SCOPE: CPT | Performed by: PHYSICIAN ASSISTANT

## 2023-05-11 RX ORDER — MECLIZINE HCL 12.5 MG 12.5 MG/1
25 TABLET ORAL ONCE
Status: COMPLETED | OUTPATIENT
Start: 2023-05-11 | End: 2023-05-11

## 2023-05-11 RX ADMIN — SODIUM CHLORIDE 500 ML: 9 INJECTION, SOLUTION INTRAVENOUS at 13:53

## 2023-05-11 RX ADMIN — MECLIZINE 25 MG: 12.5 TABLET ORAL at 16:00

## 2023-05-11 ASSESSMENT — ACTIVITIES OF DAILY LIVING (ADL): ADLS_ACUITY_SCORE: 35

## 2023-05-11 NOTE — ED NOTES
Ambulate to bathroom independently.  States she gets dizzy when she bends over and moves head side to side.

## 2023-05-11 NOTE — DISCHARGE INSTRUCTIONS
Your labs and imaging are all reassuring.    Given you are feeling better and your symptoms seem most consistent with a peripheral vertigo, we are discharging you home.    Please schedule a follow-up with your primary care doctor.  If you have worsening dizziness, worsening headaches, new neurologic changes such as one-sided weakness or vision changes or difficulty talking, return to the ER.

## 2023-05-11 NOTE — ED PROVIDER NOTES
ED PROVIDER NOTE    EMERGENCY DEPARTMENT ENCOUNTER      NAME: Meggan Mantilla  AGE: 67 year old female  YOB: 1955  MRN: [id number]  EVALUATION DATE & TIME: No admission date for patient encounter.    PCP: Deidre Starr    ED PROVIDER: Camille Maharaj PA-C      Chief Complaint   Patient presents with     Dizziness         FINAL IMPRESSION:  No diagnosis found.      MEDICAL DECISION MAKING:    Pertinent Labs & Imaging studies reviewed. (See chart for details)  67 year old female presents to the Emergency Department with her daughter for evaluation of headache and dizziness. They report a prior fall ~ 2 months ago and she has had ongoing headaches since 2 weeks after the fall. Over the last week she has had more dizziness.  The dizziness is only present with movement and specifically head movement. No other neurologic symptoms.    Here, she is hypertensive but otherwise vitals stable. Her neuro exam is completely normal. She ambulates without difficulty. Presentation seems most c/w peripheral vertigo and tension type headache. Could also be post-concussive syndrome. Daughter also notes she has been under more stress and eating a little less than normal. Also considered dehydration, anemia, ERICK, metabolic imbalance. Nothing to suggest ACS, PE. No infectious symptoms.    With her head trauma and no prior workup did obtain head CT and this was unremarkable. Also obtained labs and UA which were also completely unremarkable.     Patient had improvement in her symptoms after some IV fluids and felt much improved after meclizine.     AT this time I feel she is stable for discharge and f/u with pcp in the next week. I considered ischemic CVA however given descriptions of dizziness, normal neuro exam and timing of symptoms with normal head CT I think this is very unlikely. Did discuss close return precautions including new neuro symptoms or worsening headaches.     At the conclusion of the encounter I discussed the results  "of all of the tests and the disposition. The questions were answered. The patient or family acknowledged understanding and was agreeable with the care plan.       Medical Decision Making    History:    Supplemental history from: Family Member/Significant Other    External Record(s) reviewed: Documented in chart, if applicable.    Work Up:    Chart documentation includes differential considered and any EKGs or imaging independently interpreted by provider, where specified.    In additional to work up documented, I considered the following work up: Documented in chart, if applicable.    External consultation:    Discussion of management with another provider: Documented in chart, if applicable    Complicating factors:    Care impacted by chronic illness: N/A    Care affected by social determinants of health: N/A    Disposition considerations: Discharge. No recommendations on prescription strength medication(s). N/A.          ED COURSE  1:30 PM Met and evaluated patient. Discussed ED plan.   3:13 PM Rechecked and updated patient. Patient states that she feels better and that her headache is improving.      MEDICATIONS GIVEN IN THE EMERGENCY:  Medications   0.9% sodium chloride BOLUS (500 mLs Intravenous $New Bag 5/11/23 2783)       NEW PRESCRIPTIONS STARTED AT TODAY'S ER VISIT  New Prescriptions    No medications on file          =================================================================    HPI    Patient information was obtained from: patient    Use of : Yes - Daughter as informal  - Language: Nathalie Mantilla is a 67 year old female who presents by walk in for evaluation of dizziness and headache.    A few days before March 15th, patient slipped and fell on ice. Her daughter received the Ring video footage on March 15th and saw it happen. Patient denies loss of consciousness but endorsed a lot of pain after the fall.  Two weeks after the fall, she developed a \"pressure\" in the left " "side of her posterior lateral head and has had headaches since then. They present to this ED today for concerns of a new onset of \"room-spinning\" dizziness for one week.This dizziness occurs only with standing or turning her body quickly.    Patient's daughter notes that patient has been under a great deal of stress lately, and her appetite and intake have been poor, so she is also concerned for dehydration.    She denies nausea, vomiting, diarrhea, shortness of breath, or chest pain. No new visual disturbances.     REVIEW OF SYSTEMS   See HPI, otherwise all other systems reviewed and are negative    PAST MEDICAL HISTORY:  History reviewed. No pertinent past medical history.    PAST SURGICAL HISTORY:  History reviewed. No pertinent surgical history.        CURRENT MEDICATIONS:    No current facility-administered medications for this encounter.    Current Outpatient Medications:      dicyclomine (BENTYL) 10 MG capsule, [DICYCLOMINE (BENTYL) 10 MG CAPSULE] TAKE 1 PILL BY MOUTH 3 TIMES EVERYDAY/ TXHUA HNUB NOJ 1 LUB TSHUAJ 3 ZAUG, Disp: 60 capsule, Rfl: 1    ALLERGIES:  Allergies   Allergen Reactions     Cat Dander [Animal Dander] Unknown     Nose pain     Dog Dander [Dog Epithelium Allergy Skin Test] Unknown     Nose pain     Nyquil [External Allergen Needs Reconciliation - See Comment] Unknown     Sores in throat and mouth       FAMILY HISTORY:  History reviewed. No pertinent family history.      VITALS:  Vitals:    05/11/23 1319   BP: 135/77   Pulse: 71   Resp: 20   Temp: 97.8  F (36.6  C)   SpO2: 96%   Weight: 63.5 kg (140 lb)   Height: 1.524 m (5')       PHYSICAL EXAM    General Appearance:  Alert, cooperative, no distress, appears stated age  HENT: Normocephalic without obvious deformity, atraumatic. Mucous membranes moist   Eyes: Conjunctiva clear, Lids normal. No discharge.   Respiratory: No distress. Lungs clear to ausculation bilaterally. No wheezes, rhonchi or stridor  Cardiovascular: Regular rate and " rhythm, no murmur. Normal cap refill. No peripheral edema  GI: Abdomen soft, nontender, normal bowel sounds  : No CVA tenderness  Musculoskeletal: Moving all extremities. No gross deformities  Integument: Warm, dry, no rashes or lesions  Neurologic: Alert & oriented to person, place and time. Normal tone. PERRL. Normal speech, no dysarthria. CN 2 full visual fields, 3/4/6 EOMI without nystagmus, 5 Sensory intact, 7 Motor intact, face symmetric, 8 Hearing intact, 9,10 11 normal strength, 12 Tongue midline.  Motor: RUE/LUE 5/5  strength and push/pull bilaterally, RLE/LLE 5/5 hip flexion and ankle dorsi/plantar flexion bilaterally. No pronator drift. Sensory: intact to light touch b/l  Gait: normal. Neg rhomberg.  Psychomotor slowing (-). Abnormal Movements (-).Rapid alternating Movements intact.  Finger nose finger intact.  Psych: Normal mood and affect        LAB:  Labs Ordered and Resulted from Time of ED Arrival to Time of ED Departure   CBC WITH PLATELETS - Abnormal       Result Value    WBC Count 4.9      RBC Count 3.89      Hemoglobin 11.5 (*)     Hematocrit 35.1      MCV 90      MCH 29.6      MCHC 32.8      RDW 13.2      Platelet Count 181     COMPREHENSIVE METABOLIC PANEL - Normal    Sodium 140      Potassium 3.7      Chloride 107      Carbon Dioxide (CO2) 23      Anion Gap 10      Urea Nitrogen 20.3      Creatinine 0.95      Calcium 9.1      Glucose 98      Alkaline Phosphatase 76      AST 28      ALT 19      Protein Total 6.7      Albumin 3.9      Bilirubin Total 0.3      GFR Estimate 65     MAGNESIUM - Normal    Magnesium 2.1     ROUTINE UA WITH MICROSCOPIC REFLEX TO CULTURE       RADIOLOGY:  Head CT w/o contrast   Final Result   IMPRESSION:   1.  No acute intracranial process.          EKG:    Performed at: 1452  Impression: NSR rate 60. Normal ECG. No change from prior on July 11, 2022  Dr. Saeed and JULIETTE have independently reviewed and interpreted the EKG(s) documented above.      Meaghan SEGOVIA  Areli, am serving as a scribe to document services personally performed by Camille Maharaj PA-C based on my observation and the provider's statements to me. I, Camille Maharaj PA-C attest that Meaghan Singleton is acting in a scribe capacity, has observed my performance of the services and has documented them in accordance with my direction.    Camille Maharaj PA-C   Emergency Medicine           Camille Maharaj PA-C  05/12/23 9523

## 2023-05-11 NOTE — ED TRIAGE NOTES
"Patient Hmong speaking, daughter here helping with language. Patient fell on ice in March \"hit her head hard\" no thinners. Developed HA and dizziness 2 weeks after and continues. Has not tried any OTC meds for pain.      "

## 2023-05-12 LAB
ATRIAL RATE - MUSE: 60 BPM
DIASTOLIC BLOOD PRESSURE - MUSE: NORMAL MMHG
INTERPRETATION ECG - MUSE: NORMAL
P AXIS - MUSE: 37 DEGREES
PR INTERVAL - MUSE: 174 MS
QRS DURATION - MUSE: 68 MS
QT - MUSE: 434 MS
QTC - MUSE: 434 MS
R AXIS - MUSE: 51 DEGREES
SYSTOLIC BLOOD PRESSURE - MUSE: NORMAL MMHG
T AXIS - MUSE: 42 DEGREES
VENTRICULAR RATE- MUSE: 60 BPM

## 2023-05-13 LAB — BACTERIA UR CULT: NORMAL

## 2023-08-12 ENCOUNTER — HEALTH MAINTENANCE LETTER (OUTPATIENT)
Age: 68
End: 2023-08-12

## 2024-10-05 ENCOUNTER — HEALTH MAINTENANCE LETTER (OUTPATIENT)
Age: 69
End: 2024-10-05